# Patient Record
Sex: MALE | Race: WHITE | NOT HISPANIC OR LATINO
[De-identification: names, ages, dates, MRNs, and addresses within clinical notes are randomized per-mention and may not be internally consistent; named-entity substitution may affect disease eponyms.]

---

## 2018-01-24 ENCOUNTER — TRANSCRIPTION ENCOUNTER (OUTPATIENT)
Age: 48
End: 2018-01-24

## 2019-06-22 ENCOUNTER — TRANSCRIPTION ENCOUNTER (OUTPATIENT)
Age: 49
End: 2019-06-22

## 2020-05-09 ENCOUNTER — TRANSCRIPTION ENCOUNTER (OUTPATIENT)
Age: 50
End: 2020-05-09

## 2020-06-06 ENCOUNTER — TRANSCRIPTION ENCOUNTER (OUTPATIENT)
Age: 50
End: 2020-06-06

## 2024-05-06 ENCOUNTER — APPOINTMENT (OUTPATIENT)
Dept: VASCULAR SURGERY | Facility: CLINIC | Age: 54
End: 2024-05-06

## 2024-06-17 ENCOUNTER — APPOINTMENT (OUTPATIENT)
Dept: VASCULAR SURGERY | Facility: CLINIC | Age: 54
End: 2024-06-17
Payer: COMMERCIAL

## 2024-06-17 VITALS
HEART RATE: 70 BPM | WEIGHT: 160 LBS | DIASTOLIC BLOOD PRESSURE: 75 MMHG | HEIGHT: 69 IN | SYSTOLIC BLOOD PRESSURE: 124 MMHG | BODY MASS INDEX: 23.7 KG/M2 | OXYGEN SATURATION: 97 %

## 2024-06-17 DIAGNOSIS — M79.673 PAIN IN UNSPECIFIED FOOT: ICD-10-CM

## 2024-06-17 DIAGNOSIS — M79.676 PAIN IN UNSPECIFIED FOOT: ICD-10-CM

## 2024-06-17 DIAGNOSIS — I73.00 RAYNAUD'S SYNDROME W/OUT GANGRENE: ICD-10-CM

## 2024-06-17 PROCEDURE — 99203 OFFICE O/P NEW LOW 30 MIN: CPT

## 2024-06-17 NOTE — HISTORY OF PRESENT ILLNESS
[FreeTextEntry1] : 53-year-old male past medical history of hypercholesterolemia controlled with statin no other medical issues who is here with feeling of bilateral feet coldness.  He feels a sensation of cold constantly worse at night than in the daytime and significantly worsens with cold weather.  He states he has to sleep with socks it is hard to feel normal.  He denies any numbness or tingling burning or pain in his legs.  He denies any swelling heaviness fatigue however he notes a prominent varicose vein only in the right thigh and anterior leg that happened following a meniscus repair several years ago.  He denies any symptoms of PAD such as claudication rest pain or wounds he is active and uses a treadmill several times a week for exercise.  He denies any history of DVT or PE.  He is a non-smoker.  He is on medications for his cholesterol and is also seeing cardiology preventatively. He has no history of autoimmune disorders.  He denies similar symptoms in the hand but does state in the cold his right 3rd digit gets colder than the others and pale.

## 2024-06-17 NOTE — ASSESSMENT
[FreeTextEntry1] : 52yo male PMH Hypercholesterolemia who is here with nearly a year bilateral foot sensation of coldness on exam no signs of PAD or venous disease.  This may be an atypical presentation of nonatherosclerotic arterial disease such as Raynaud's.  Though I find this unlikely.  This can also be secondary to a neurogenic issue coming from either the spine or peripheral neuropathy. Will plan for a toe PPG with cold immersion to rule out Raynaud's Continue exercise therapy If all arterial and venous causes are ruled out would recommend follow-up with neurology

## 2024-06-17 NOTE — PHYSICAL EXAM
[JVD] : no jugular venous distention  [Normal Breath Sounds] : Normal breath sounds [Normal Rate and Rhythm] : normal rate and rhythm [2+] : left 2+ [Varicose Veins Of Lower Extremities] : present [Varicose Veins Of The Right Leg] : of the right leg [Ankle Swelling On The Right] : mild [de-identified] : well appearing  [de-identified] : wnl

## 2024-07-31 ENCOUNTER — APPOINTMENT (OUTPATIENT)
Dept: VASCULAR SURGERY | Facility: CLINIC | Age: 54
End: 2024-07-31
Payer: COMMERCIAL

## 2024-07-31 ENCOUNTER — APPOINTMENT (OUTPATIENT)
Dept: VASCULAR SURGERY | Facility: CLINIC | Age: 54
End: 2024-07-31

## 2024-07-31 VITALS
DIASTOLIC BLOOD PRESSURE: 82 MMHG | OXYGEN SATURATION: 98 % | HEIGHT: 69 IN | SYSTOLIC BLOOD PRESSURE: 135 MMHG | BODY MASS INDEX: 23.85 KG/M2 | WEIGHT: 161 LBS | HEART RATE: 65 BPM

## 2024-07-31 PROCEDURE — 93923 UPR/LXTR ART STDY 3+ LVLS: CPT

## 2024-07-31 PROCEDURE — 99213 OFFICE O/P EST LOW 20 MIN: CPT

## 2024-08-02 NOTE — HISTORY OF PRESENT ILLNESS
[FreeTextEntry1] : 53-year-old male past medical history of hypercholesterolemia controlled with statin no other medical issues who is here with feeling of bilateral feet coldness. He feels a sensation of cold constantly worse at night than in the daytime and significantly worsens with cold weather. He states he has to sleep with socks it is hard to feel normal. He denies any numbness or tingling burning or pain in his legs. He denies any swelling heaviness fatigue however he notes a prominent varicose vein only in the right thigh and anterior leg that happened following a meniscus repair several years ago. He denies any symptoms of PAD such as claudication rest pain or wounds he is active and uses a treadmill several times a week for exercise. He denies any history of DVT or PE. He is a non-smoker. He is on medications for his cholesterol and is also seeing cardiology preventatively. He has no history of autoimmune disorders.  He denies similar symptoms in the hand but does state in the cold his right 3rd digit gets colder than the others and pale. [de-identified] : Presenting today for toe PPG with cold immersion to rule out Raynaud's;

## 2024-08-02 NOTE — HISTORY OF PRESENT ILLNESS
[FreeTextEntry1] : 53-year-old male past medical history of hypercholesterolemia controlled with statin no other medical issues who is here with feeling of bilateral feet coldness. He feels a sensation of cold constantly worse at night than in the daytime and significantly worsens with cold weather. He states he has to sleep with socks it is hard to feel normal. He denies any numbness or tingling burning or pain in his legs. He denies any swelling heaviness fatigue however he notes a prominent varicose vein only in the right thigh and anterior leg that happened following a meniscus repair several years ago. He denies any symptoms of PAD such as claudication rest pain or wounds he is active and uses a treadmill several times a week for exercise. He denies any history of DVT or PE. He is a non-smoker. He is on medications for his cholesterol and is also seeing cardiology preventatively. He has no history of autoimmune disorders.  He denies similar symptoms in the hand but does state in the cold his right 3rd digit gets colder than the others and pale. [de-identified] : Presenting today for toe PPG with cold immersion to rule out Raynaud's;

## 2024-08-02 NOTE — ASSESSMENT
[FreeTextEntry1] : 53 year M with bilateral foot coldness; toe PPG with cold immersion today likely consistent with Raynaud's.